# Patient Record
Sex: FEMALE | Race: WHITE | HISPANIC OR LATINO | URBAN - NONMETROPOLITAN AREA
[De-identification: names, ages, dates, MRNs, and addresses within clinical notes are randomized per-mention and may not be internally consistent; named-entity substitution may affect disease eponyms.]

---

## 2017-04-05 ENCOUNTER — FOLLOW UP ESTABLISHED (OUTPATIENT)
Dept: URBAN - NONMETROPOLITAN AREA CLINIC 6 | Facility: CLINIC | Age: 67
End: 2017-04-05
Payer: MEDICARE

## 2017-04-05 PROCEDURE — 92083 EXTENDED VISUAL FIELD XM: CPT | Performed by: OPTOMETRIST

## 2017-04-05 PROCEDURE — 92014 COMPRE OPH EXAM EST PT 1/>: CPT | Performed by: OPTOMETRIST

## 2017-04-05 RX ORDER — LATANOPROST 50 UG/ML
0.005 % SOLUTION OPHTHALMIC
Qty: 3 | Refills: 4 | Status: INACTIVE
Start: 2017-04-05 | End: 2018-04-19

## 2017-04-05 RX ORDER — DORZOLAMIDE HYDROCHLORIDE 20 MG/ML
2 % SOLUTION OPHTHALMIC
Qty: 3 | Refills: 1 | Status: INACTIVE
Start: 2017-04-05 | End: 2017-10-06

## 2017-04-05 ASSESSMENT — VISUAL ACUITY
OS: 20/20
OD: 20/20

## 2017-04-05 ASSESSMENT — INTRAOCULAR PRESSURE
OD: 15
OS: 16

## 2018-04-19 ENCOUNTER — FOLLOW UP ESTABLISHED (OUTPATIENT)
Dept: URBAN - NONMETROPOLITAN AREA CLINIC 6 | Facility: CLINIC | Age: 68
End: 2018-04-19
Payer: MEDICARE

## 2018-04-19 DIAGNOSIS — H52.4 PRESBYOPIA: ICD-10-CM

## 2018-04-19 PROCEDURE — 92014 COMPRE OPH EXAM EST PT 1/>: CPT | Performed by: OPTOMETRIST

## 2018-04-19 PROCEDURE — 92015 DETERMINE REFRACTIVE STATE: CPT | Performed by: OPTOMETRIST

## 2018-04-19 PROCEDURE — 92083 EXTENDED VISUAL FIELD XM: CPT | Performed by: OPTOMETRIST

## 2018-04-19 RX ORDER — LATANOPROST 50 UG/ML
0.005 % SOLUTION OPHTHALMIC
Qty: 3 | Refills: 4 | Status: INACTIVE
Start: 2018-04-19 | End: 2019-02-25

## 2018-04-19 RX ORDER — DORZOLAMIDE HYDROCHLORIDE 20 MG/ML
2 % SOLUTION OPHTHALMIC
Qty: 3 | Refills: 2 | Status: INACTIVE
Start: 2018-04-19 | End: 2019-02-18

## 2018-04-19 ASSESSMENT — INTRAOCULAR PRESSURE
OD: 19
OS: 19

## 2018-04-19 ASSESSMENT — VISUAL ACUITY
OD: 20/25
OS: 20/25

## 2019-02-25 ENCOUNTER — FOLLOW UP ESTABLISHED (OUTPATIENT)
Dept: URBAN - NONMETROPOLITAN AREA CLINIC 6 | Facility: CLINIC | Age: 69
End: 2019-02-25
Payer: MEDICARE

## 2019-02-25 DIAGNOSIS — H52.13 MYOPIA, BILATERAL: ICD-10-CM

## 2019-02-25 PROCEDURE — 92015 DETERMINE REFRACTIVE STATE: CPT | Performed by: OPTOMETRIST

## 2019-02-25 PROCEDURE — 92014 COMPRE OPH EXAM EST PT 1/>: CPT | Performed by: OPTOMETRIST

## 2019-02-25 PROCEDURE — 92083 EXTENDED VISUAL FIELD XM: CPT | Performed by: OPTOMETRIST

## 2019-02-25 RX ORDER — DORZOLAMIDE HCL 20 MG/ML
2 % SOLUTION/ DROPS OPHTHALMIC
Qty: 3 | Refills: 2 | Status: ACTIVE
Start: 2019-02-25

## 2019-02-25 RX ORDER — LATANOPROST 50 UG/ML
0.005 % SOLUTION OPHTHALMIC
Qty: 3 | Refills: 4 | Status: ACTIVE
Start: 2019-02-25

## 2019-02-25 ASSESSMENT — KERATOMETRY
OD: 42.70
OS: 43.41

## 2019-02-25 ASSESSMENT — VISUAL ACUITY
OD: 20/20
OS: 20/20

## 2019-02-25 ASSESSMENT — INTRAOCULAR PRESSURE
OS: 13
OD: 13

## 2021-01-27 ENCOUNTER — FOLLOW UP ESTABLISHED (OUTPATIENT)
Dept: URBAN - NONMETROPOLITAN AREA CLINIC 6 | Facility: CLINIC | Age: 71
End: 2021-01-27
Payer: MEDICARE

## 2021-01-27 PROCEDURE — 92014 COMPRE OPH EXAM EST PT 1/>: CPT | Performed by: OPTOMETRIST

## 2021-01-27 ASSESSMENT — INTRAOCULAR PRESSURE
OD: 15
OS: 16

## 2021-01-27 ASSESSMENT — VISUAL ACUITY
OD: 20/40
OS: 20/40

## 2021-02-11 ENCOUNTER — NEW PATIENT (OUTPATIENT)
Dept: URBAN - NONMETROPOLITAN AREA CLINIC 6 | Facility: CLINIC | Age: 71
End: 2021-02-11
Payer: MEDICARE

## 2021-02-11 PROCEDURE — 99204 OFFICE O/P NEW MOD 45 MIN: CPT | Performed by: OPHTHALMOLOGY

## 2021-02-11 RX ORDER — ACETAZOLAMIDE 250 MG/1
250 MG TABLET ORAL
Qty: 4 | Refills: 1 | Status: INACTIVE
Start: 2021-02-11 | End: 2021-09-29

## 2021-02-11 ASSESSMENT — INTRAOCULAR PRESSURE
OD: 18
OS: 16

## 2021-02-11 ASSESSMENT — VISUAL ACUITY
OS: 20/40
OD: 20/40

## 2021-02-11 ASSESSMENT — KERATOMETRY
OD: 42.70
OS: 43.27

## 2021-02-16 ENCOUNTER — Encounter (OUTPATIENT)
Dept: URBAN - NONMETROPOLITAN AREA CLINIC 6 | Facility: CLINIC | Age: 71
End: 2021-02-16
Payer: MEDICARE

## 2021-02-16 DIAGNOSIS — H25.13 AGE-RELATED NUCLEAR CATARACT, BILATERAL: Primary | ICD-10-CM

## 2021-03-03 ENCOUNTER — SURGERY (OUTPATIENT)
Dept: URBAN - NONMETROPOLITAN AREA SURGERY 1 | Facility: SURGERY | Age: 71
End: 2021-03-03
Payer: MEDICARE

## 2021-03-03 PROCEDURE — 66984 XCAPSL CTRC RMVL W/O ECP: CPT | Performed by: OPHTHALMOLOGY

## 2021-03-04 ENCOUNTER — POST OP (OUTPATIENT)
Dept: URBAN - NONMETROPOLITAN AREA CLINIC 6 | Facility: CLINIC | Age: 71
End: 2021-03-04
Payer: COMMERCIAL

## 2021-03-04 PROCEDURE — 99024 POSTOP FOLLOW-UP VISIT: CPT | Performed by: OPTOMETRIST

## 2021-03-04 ASSESSMENT — INTRAOCULAR PRESSURE: OD: 18

## 2021-03-11 ENCOUNTER — POST OP (OUTPATIENT)
Dept: URBAN - NONMETROPOLITAN AREA CLINIC 6 | Facility: CLINIC | Age: 71
End: 2021-03-11
Payer: COMMERCIAL

## 2021-03-11 PROCEDURE — 99024 POSTOP FOLLOW-UP VISIT: CPT | Performed by: OPTOMETRIST

## 2021-03-11 ASSESSMENT — VISUAL ACUITY
OS: 20/40
OD: 20/25

## 2021-03-11 ASSESSMENT — INTRAOCULAR PRESSURE
OS: 16
OD: 16

## 2021-03-17 ENCOUNTER — SURGERY (OUTPATIENT)
Dept: URBAN - NONMETROPOLITAN AREA SURGERY 1 | Facility: SURGERY | Age: 71
End: 2021-03-17
Payer: MEDICARE

## 2021-03-17 PROCEDURE — 66984 XCAPSL CTRC RMVL W/O ECP: CPT | Performed by: OPHTHALMOLOGY

## 2021-03-18 ENCOUNTER — POST OP (OUTPATIENT)
Dept: URBAN - NONMETROPOLITAN AREA CLINIC 6 | Facility: CLINIC | Age: 71
End: 2021-03-18
Payer: MEDICARE

## 2021-03-18 DIAGNOSIS — Z96.1 PRESENCE OF INTRAOCULAR LENS: Primary | ICD-10-CM

## 2021-03-18 PROCEDURE — 99024 POSTOP FOLLOW-UP VISIT: CPT | Performed by: OPTOMETRIST

## 2021-03-18 ASSESSMENT — INTRAOCULAR PRESSURE
OS: 19
OD: 16

## 2021-04-01 ENCOUNTER — POST-OPERATIVE VISIT (OUTPATIENT)
Dept: URBAN - NONMETROPOLITAN AREA CLINIC 6 | Facility: CLINIC | Age: 71
End: 2021-04-01
Payer: MEDICARE

## 2021-04-01 PROCEDURE — 99024 POSTOP FOLLOW-UP VISIT: CPT | Performed by: OPTOMETRIST

## 2021-04-01 ASSESSMENT — VISUAL ACUITY
OS: 20/25
OD: 20/30

## 2021-04-01 ASSESSMENT — INTRAOCULAR PRESSURE
OD: 16
OS: 16

## 2021-04-01 NOTE — IMPRESSION/PLAN
Impression: S/P CE/Standard IOL OS - 15 Days. Presence of intraocular lens  Z96.1. Excellent post op course   Condition is improving - OU Plan: Ok to use OTC readers where needed. --Advised patient to use artificial tears for comfort.

## 2021-09-29 ENCOUNTER — OFFICE VISIT (OUTPATIENT)
Dept: URBAN - NONMETROPOLITAN AREA CLINIC 6 | Facility: CLINIC | Age: 71
End: 2021-09-29
Payer: MEDICARE

## 2021-09-29 PROCEDURE — 99212 OFFICE O/P EST SF 10 MIN: CPT | Performed by: OPTOMETRIST

## 2021-09-29 ASSESSMENT — INTRAOCULAR PRESSURE
OS: 16
OD: 16

## 2021-09-29 NOTE — IMPRESSION/PLAN
Impression: Primary open-angle glaucoma, moderate stage, bilateral Plan: IOP stable today. Continue with latanoprost QHS OU and Brimonidine BID OU. RTC in 6 month for VF 24-2/IOP/RNFL OCT.

## 2022-03-29 ENCOUNTER — OFFICE VISIT (OUTPATIENT)
Dept: URBAN - NONMETROPOLITAN AREA CLINIC 6 | Facility: CLINIC | Age: 72
End: 2022-03-29
Payer: MEDICARE

## 2022-03-29 DIAGNOSIS — H40.1132 PRIMARY OPEN-ANGLE GLAUCOMA, BILATERAL, MODERATE STAGE: Primary | ICD-10-CM

## 2022-03-29 DIAGNOSIS — H26.493 OTHER SECONDARY CATARACT, BILATERAL: ICD-10-CM

## 2022-03-29 PROCEDURE — 92083 EXTENDED VISUAL FIELD XM: CPT | Performed by: OPTOMETRIST

## 2022-03-29 PROCEDURE — 99213 OFFICE O/P EST LOW 20 MIN: CPT | Performed by: OPTOMETRIST

## 2022-03-29 ASSESSMENT — INTRAOCULAR PRESSURE
OS: 16
OD: 16

## 2022-03-29 NOTE — IMPRESSION/PLAN
Impression: Primary open-angle glaucoma, moderate stage, bilateral Plan: Ordered and reviewed VF 24-2, scan showed superior actuates. IOP stable today. Continue with latanoprost QHS OU and Brimonidine BID OU. RTC in 6 month for VF 24-2/IOP/RNFL OCT.

## 2022-03-29 NOTE — IMPRESSION/PLAN
Impression: Other secondary cataract, bilateral: H26.493. Plan: Patient education regarding findings. No treatment recommended at this time. Continue to monitor annually.

## 2022-09-27 ENCOUNTER — OFFICE VISIT (OUTPATIENT)
Dept: URBAN - NONMETROPOLITAN AREA CLINIC 6 | Facility: CLINIC | Age: 72
End: 2022-09-27
Payer: MEDICARE

## 2022-09-27 DIAGNOSIS — H40.1132 PRIMARY OPEN-ANGLE GLAUCOMA, BILATERAL, MODERATE STAGE: Primary | ICD-10-CM

## 2022-09-27 DIAGNOSIS — H26.493 OTHER SECONDARY CATARACT, BILATERAL: ICD-10-CM

## 2022-09-27 PROCEDURE — 92014 COMPRE OPH EXAM EST PT 1/>: CPT | Performed by: OPTOMETRIST

## 2022-09-27 PROCEDURE — 92133 CPTRZD OPH DX IMG PST SGM ON: CPT | Performed by: OPTOMETRIST

## 2022-09-27 PROCEDURE — 92083 EXTENDED VISUAL FIELD XM: CPT | Performed by: OPTOMETRIST

## 2022-09-27 ASSESSMENT — INTRAOCULAR PRESSURE
OS: 14
OD: 15

## 2022-09-27 NOTE — IMPRESSION/PLAN
Impression: Primary open-angle glaucoma, moderate stage, bilateral Plan: Ordered and reviewed OCT RNFL and VF 24-2. Better VF today compared to previous. Stable findings. IOP stable today OU. Continue with latanoprost QHS OU and Brimonidine BID OU.

## 2022-09-27 NOTE — IMPRESSION/PLAN
Impression: Other secondary cataract, bilateral: H26.493. Plan: Discussed treatment options with patient. YAG laser capsulotomy is recommended. Surgical risks and benefits were discussed in detail, explained, and understood by patient. Patient elects to have surgery. Recommend YAG PC OU, OD first then OS.

## 2022-10-26 ENCOUNTER — SURGERY (OUTPATIENT)
Dept: URBAN - NONMETROPOLITAN AREA SURGERY 1 | Facility: SURGERY | Age: 72
End: 2022-10-26
Payer: MEDICARE

## 2022-10-26 PROCEDURE — 66821 AFTER CATARACT LASER SURGERY: CPT | Performed by: OPHTHALMOLOGY

## 2022-11-02 ENCOUNTER — SURGERY (OUTPATIENT)
Dept: URBAN - NONMETROPOLITAN AREA SURGERY 1 | Facility: SURGERY | Age: 72
End: 2022-11-02
Payer: MEDICARE

## 2022-11-02 PROCEDURE — 66821 AFTER CATARACT LASER SURGERY: CPT | Performed by: OPHTHALMOLOGY

## 2022-11-03 ENCOUNTER — POST-OPERATIVE VISIT (OUTPATIENT)
Dept: URBAN - NONMETROPOLITAN AREA CLINIC 6 | Facility: CLINIC | Age: 72
End: 2022-11-03
Payer: MEDICARE

## 2022-11-03 DIAGNOSIS — Z48.810 ENCOUNTER FOR SURGICAL AFTERCARE FOLLOWING SURGERY ON A SENSE ORGAN: Primary | ICD-10-CM

## 2022-11-03 PROCEDURE — 99024 POSTOP FOLLOW-UP VISIT: CPT | Performed by: OPHTHALMOLOGY

## 2022-11-03 ASSESSMENT — INTRAOCULAR PRESSURE
OS: 13
OD: 13

## 2022-11-03 NOTE — IMPRESSION/PLAN
Impression:  Encounter for surgical aftercare following surgery on a sense organ  Z48.810. Excellent post op course   Post operative instructions reviewed - Condition is improving - Plan: Call if any s/s of RD as discussed.   Keep appointment with Dr. Estee Morrison and dilate if symptoms warrant repeat dilated exam.

## 2022-11-17 ENCOUNTER — POST-OPERATIVE VISIT (OUTPATIENT)
Dept: URBAN - NONMETROPOLITAN AREA CLINIC 6 | Facility: CLINIC | Age: 72
End: 2022-11-17
Payer: MEDICARE

## 2022-11-17 DIAGNOSIS — Z48.810 ENCOUNTER FOR SURGICAL AFTERCARE FOLLOWING SURGERY ON A SENSE ORGAN: Primary | ICD-10-CM

## 2022-11-17 PROCEDURE — 99024 POSTOP FOLLOW-UP VISIT: CPT | Performed by: OPTOMETRIST

## 2022-11-17 ASSESSMENT — INTRAOCULAR PRESSURE
OS: 15
OD: 14

## 2022-11-17 NOTE — IMPRESSION/PLAN
Impression: S/P YAG Capsulotomy (Yttrium Aluminum [place]) OS - 15 Days. Encounter for surgical aftercare following surgery on a sense organ  Z48.810. Excellent post op course Plan: Call if any problems develop.

## 2023-11-20 ENCOUNTER — Encounter (OUTPATIENT)
Dept: URBAN - NONMETROPOLITAN AREA CLINIC 6 | Facility: CLINIC | Age: 73
End: 2023-11-20
Payer: MEDICARE

## 2023-11-20 DIAGNOSIS — H43.393 OTHER VITREOUS OPACITIES, BILATERAL: Primary | ICD-10-CM

## 2023-11-20 PROCEDURE — 92014 COMPRE OPH EXAM EST PT 1/>: CPT | Performed by: OPTOMETRIST

## 2025-06-23 ENCOUNTER — OFFICE VISIT (OUTPATIENT)
Dept: URBAN - NONMETROPOLITAN AREA CLINIC 6 | Facility: CLINIC | Age: 75
End: 2025-06-23
Payer: MEDICARE

## 2025-06-23 DIAGNOSIS — H52.222 REGULAR ASTIGMATISM, LEFT EYE: ICD-10-CM

## 2025-06-23 DIAGNOSIS — H43.813 VITREOUS DEGENERATION, BILATERAL: ICD-10-CM

## 2025-06-23 DIAGNOSIS — H40.1132 PRIMARY OPEN-ANGLE GLAUCOMA, BILATERAL, MODERATE STAGE: Primary | ICD-10-CM

## 2025-06-23 PROCEDURE — 92015 DETERMINE REFRACTIVE STATE: CPT | Performed by: OPTOMETRIST

## 2025-06-23 PROCEDURE — 92014 COMPRE OPH EXAM EST PT 1/>: CPT | Performed by: OPTOMETRIST

## 2025-06-23 ASSESSMENT — VISUAL ACUITY
OS: 20/30
OD: 20/30

## 2025-06-23 ASSESSMENT — INTRAOCULAR PRESSURE
OD: 14
OS: 16

## 2025-06-23 ASSESSMENT — KERATOMETRY
OS: 42.86
OD: 42.16

## 2025-07-01 ENCOUNTER — OFFICE VISIT (OUTPATIENT)
Dept: URBAN - NONMETROPOLITAN AREA CLINIC 6 | Facility: CLINIC | Age: 75
End: 2025-07-01
Payer: MEDICARE

## 2025-07-01 DIAGNOSIS — H43.813 VITREOUS DEGENERATION, BILATERAL: ICD-10-CM

## 2025-07-01 DIAGNOSIS — H40.1132 PRIMARY OPEN-ANGLE GLAUCOMA, BILATERAL, MODERATE STAGE: Primary | ICD-10-CM

## 2025-07-01 PROCEDURE — 92133 CPTRZD OPH DX IMG PST SGM ON: CPT | Performed by: OPTOMETRIST

## 2025-07-01 PROCEDURE — 92014 COMPRE OPH EXAM EST PT 1/>: CPT | Performed by: OPTOMETRIST

## 2025-07-01 PROCEDURE — 92083 EXTENDED VISUAL FIELD XM: CPT | Performed by: OPTOMETRIST

## 2025-07-01 RX ORDER — DORZOLAMIDE HCL 20 MG/ML
2 % SOLUTION/ DROPS OPHTHALMIC
Qty: 5 | Refills: 5 | Status: ACTIVE
Start: 2025-07-01

## 2025-07-01 RX ORDER — LATANOPROST 50 UG/ML
0.005 % SOLUTION OPHTHALMIC
Qty: 2.5 | Refills: 5 | Status: ACTIVE
Start: 2025-07-01

## 2025-07-01 ASSESSMENT — INTRAOCULAR PRESSURE
OS: 16
OD: 18